# Patient Record
Sex: FEMALE | Race: WHITE | Employment: UNEMPLOYED | URBAN - METROPOLITAN AREA
[De-identification: names, ages, dates, MRNs, and addresses within clinical notes are randomized per-mention and may not be internally consistent; named-entity substitution may affect disease eponyms.]

---

## 2019-08-01 ENCOUNTER — HOSPITAL ENCOUNTER (OUTPATIENT)
Dept: GENERAL RADIOLOGY | Age: 59
Discharge: HOME | End: 2019-08-03
Payer: COMMERCIAL

## 2019-08-01 DIAGNOSIS — Z12.31 VISIT FOR SCREENING MAMMOGRAM: ICD-10-CM

## 2019-08-01 PROCEDURE — 77063 BREAST TOMOSYNTHESIS BI: CPT

## 2020-08-04 ENCOUNTER — HOSPITAL ENCOUNTER (OUTPATIENT)
Dept: GENERAL RADIOLOGY | Age: 60
Discharge: HOME | End: 2020-08-06
Payer: COMMERCIAL

## 2020-08-04 PROCEDURE — 77067 SCR MAMMO BI INCL CAD: CPT

## 2021-09-22 ENCOUNTER — HOSPITAL ENCOUNTER (OUTPATIENT)
Dept: GENERAL RADIOLOGY | Age: 61
Discharge: HOME | End: 2021-09-24
Payer: COMMERCIAL

## 2021-09-22 DIAGNOSIS — Z12.31 VISIT FOR SCREENING MAMMOGRAM: ICD-10-CM

## 2021-09-22 PROCEDURE — 77063 BREAST TOMOSYNTHESIS BI: CPT

## 2021-10-04 DIAGNOSIS — M79.671 RIGHT FOOT PAIN: ICD-10-CM

## 2021-10-04 DIAGNOSIS — M79.672 LEFT FOOT PAIN: Primary | ICD-10-CM

## 2021-10-05 ENCOUNTER — OFFICE VISIT (OUTPATIENT)
Dept: PODIATRY | Age: 61
End: 2021-10-05
Payer: COMMERCIAL

## 2021-10-05 VITALS — HEIGHT: 60 IN | BODY MASS INDEX: 29.45 KG/M2 | WEIGHT: 150 LBS

## 2021-10-05 DIAGNOSIS — R26.2 DIFFICULTY WALKING: ICD-10-CM

## 2021-10-05 DIAGNOSIS — M77.41 METATARSALGIA OF BOTH FEET: Primary | ICD-10-CM

## 2021-10-05 DIAGNOSIS — M77.51 TENDINITIS OF RIGHT FOOT: ICD-10-CM

## 2021-10-05 DIAGNOSIS — R60.0 LOCALIZED EDEMA: ICD-10-CM

## 2021-10-05 DIAGNOSIS — M77.42 METATARSALGIA OF BOTH FEET: Primary | ICD-10-CM

## 2021-10-05 DIAGNOSIS — M79.671 PAIN OF RIGHT FOOT: ICD-10-CM

## 2021-10-05 PROCEDURE — 29580 STRAPPING UNNA BOOT: CPT | Performed by: PODIATRIST

## 2021-10-05 PROCEDURE — 99203 OFFICE O/P NEW LOW 30 MIN: CPT | Performed by: PODIATRIST

## 2021-10-05 RX ORDER — EPINEPHRINE 0.15 MG/.3ML
INJECTION INTRAMUSCULAR
COMMUNITY
Start: 2021-09-07

## 2021-10-05 RX ORDER — EPINEPHRINE 0.3 MG/.3ML
INJECTION SUBCUTANEOUS
COMMUNITY
Start: 2021-09-08

## 2021-10-05 RX ORDER — LEVOTHYROXINE SODIUM 75 MCG
TABLET ORAL
COMMUNITY
Start: 2021-07-20

## 2021-10-05 NOTE — PROGRESS NOTES
10/5/21     Lauren Bowman    : 1960 Sex: female   Age: 59 y.o. Patient was referred by: None  Patient's PCP/Provider is:  Sohan Zepeda MD    Subjective:    Patient is seen today for evaluation regarding right foot pain and swelling to the midfoot and lateral hindfoot region. Chief Complaint   Patient presents with    Foot Pain     bilateral foot        HPI: Patient stated the issues have been going on for several months now and periodically are more symptomatic right lower extremity versus the left. Patient denies any recent injuries or changes in activities. Patient has tried OTC treatments without improvement noted. No other additional abnormalities noted. ROS:  Const: Positives and pertinent negatives as per HPI. Musculo: Denies symptoms other than stated above. Neuro: Denies symptoms other than stated above. Skin: Denies symptoms other than stated above. Current Medications:    Current Outpatient Medications:     vitamin D (CHOLECALCIFEROL) 28728 UNIT CAPS, cholecalciferol (vitamin D3) 1,250 mcg (50,000 unit) capsule  TAKE 1 CAPSULE BY MOUTH EVERY OTHER WEEK, Disp: , Rfl:     EPINEPHrine (EPIPEN JR) 0.15 MG/0.3ML SOAJ, , Disp: , Rfl:     EPINEPHrine (EPIPEN) 0.3 MG/0.3ML SOAJ injection, USE AS NEEDED AFTER BEE STING, Disp: , Rfl:     SYNTHROID 75 MCG tablet, , Disp: , Rfl:     Allergies: Allergies   Allergen Reactions    Beeswax Hives, Itching and Swelling       Vitals:    10/05/21 0929   Weight: 150 lb (68 kg)   Height: 5' (1.524 m)        No past medical history on file. No family history on file. No past surgical history on file. Social History     Tobacco Use    Smoking status: Former Smoker    Smokeless tobacco: Never Used   Substance Use Topics    Alcohol use: Not on file    Drug use: Not on file           Diagnostic studies:    No results found. Procedures: An unna boot  compressive wrap was applied to the right lower extremity.  It's purpose is to decrease  the amount of edema present, and to allow proper healing of the soft tissues. The patient was instructed to keep the unna boot clean, dry and intact until the next follow up visit. The patient was instructed to look for signs of redness, irritation, blistering and pain. If these or any other symptoms were to develop, they were advised to contact the office immediately for reevaluation. Exam:  VASCULAR: Pedal pulses palpable bilateral foot. Capillary refill time less than 5 seconds digits 1 through 5 bilateral foot. NEUROLOGICAL: Epicritic sensations intact and symmetrical  DERMATOLOGICAL: Mild edematous issues noted without ecchymosis overlying the right lateral midfoot and hindfoot regions. MUSCULOSKELETAL: Tenderness noted to palpation along the lateral peroneal tendon region right lower extremity. Tenderness also noted into the plantar ball the foot regions bilaterally. Mild antalgic gait noted upon evaluation. Plan Per Assessment  Joanne Benavidez was seen today for foot pain. Diagnoses and all orders for this visit:    Metatarsalgia of both feet    Tendinitis of right foot    Localized edema    Pain of right foot    Difficulty walking        1. New patient evaluation and management  2. We did review x-ray studies with patient in detail today bilateral foot. No acute osseous abnormalities noted. 3. We did recommend compression dressing to the symptomatic right lower extremity which was applied as described above. 4. We did discuss additional treatment options with patient in detail today. Patient was advised continued use of her good supportive shoe gear in the interim. Patient was advised on purchasing OTC insoles as instructed. 5. Patient will be followed up at a later date for continued evaluation and management.       Seen By:    Jose Alcocer DPM    Electronically signed by Jose Alcocer DPM on 10/5/2021 at 10:23 AM      This note was created using voice recognition software. The note was reviewed however may contain grammatical errors.

## 2021-10-05 NOTE — PROGRESS NOTES
Patient is in today for evaluation of both feet. Patient says her right foot will get swollen and hard to walk up and down the stairs. She will also get a sharp pain the side of her foot. Patient says she gets a snapping sound in her left foot.  pcp is Sydney Thompson MD  Last ov 6/16/21

## 2021-10-19 ENCOUNTER — OFFICE VISIT (OUTPATIENT)
Dept: PODIATRY | Age: 61
End: 2021-10-19
Payer: COMMERCIAL

## 2021-10-19 VITALS — HEIGHT: 60 IN | BODY MASS INDEX: 29.06 KG/M2 | WEIGHT: 148 LBS

## 2021-10-19 DIAGNOSIS — M77.42 METATARSALGIA OF BOTH FEET: Primary | ICD-10-CM

## 2021-10-19 DIAGNOSIS — M77.51 TENDINITIS OF RIGHT FOOT: ICD-10-CM

## 2021-10-19 DIAGNOSIS — M77.41 METATARSALGIA OF BOTH FEET: Primary | ICD-10-CM

## 2021-10-19 PROBLEM — M79.671 PAIN OF RIGHT FOOT: Status: RESOLVED | Noted: 2021-10-05 | Resolved: 2021-10-19

## 2021-10-19 PROBLEM — R60.0 LOCALIZED EDEMA: Status: RESOLVED | Noted: 2021-10-05 | Resolved: 2021-10-19

## 2021-10-19 PROCEDURE — 99213 OFFICE O/P EST LOW 20 MIN: CPT | Performed by: PODIATRIST

## 2021-10-19 NOTE — PROGRESS NOTES
Patient is in today for 2 week right foot pain. Patient says the unna wrap did help as the pain has decreased.  It does come and go on the top. pcp is Terence Saldana MD  Last ov 6/16/21

## 2021-10-19 NOTE — PROGRESS NOTES
10/19/21     Crispin Patino    : 1960   Sex: female    Age: 59 y.o. Patient's PCP/Provider is:  Jenifer Washington MD    Subjective:  Patient is seen today for follow-up regarding continued care issues to both lower extremities. Overall the patient is doing well with the current OTC insoles recommended. She is having minimal discomfort with her everyday activities. No other additional issues noted. Chief Complaint   Patient presents with    Foot Pain     right foot        ROS:  Const: Positives and pertinent negatives as per HPI. Musculo: Denies symptoms other than stated above. Neuro: Denies symptoms other than stated above. Skin: Denies symptoms other than stated above. Current Medications:    Current Outpatient Medications:     vitamin D (CHOLECALCIFEROL) 21746 UNIT CAPS, cholecalciferol (vitamin D3) 1,250 mcg (50,000 unit) capsule  TAKE 1 CAPSULE BY MOUTH EVERY OTHER WEEK, Disp: , Rfl:     EPINEPHrine (EPIPEN JR) 0.15 MG/0.3ML SOAJ, , Disp: , Rfl:     EPINEPHrine (EPIPEN) 0.3 MG/0.3ML SOAJ injection, USE AS NEEDED AFTER BEE STING, Disp: , Rfl:     SYNTHROID 75 MCG tablet, , Disp: , Rfl:     Allergies: Allergies   Allergen Reactions    Beeswax Hives, Itching and Swelling       Vitals:    10/19/21 0845   Weight: 148 lb (67.1 kg)   Height: 5' (1.524 m)       Exam:  NVS unchanged. No edema or ecchymosis noted plantar forefoot regions bilaterally. Increased ROM noted bilateral ankle/foot. Improved gait noted upon evaluation. Diagnostic Studies:     XR FOOT LEFT (MIN 3 VIEWS)    Result Date: 10/5/2021  EXAMINATION: THREE XRAY VIEWS OF THE LEFT FOOT; THREE XRAY VIEWS OF THE RIGHT FOOT 10/5/2021 9:21 am COMPARISON: None HISTORY: ORDERING SYSTEM PROVIDED HISTORY: Left foot pain TECHNOLOGIST PROVIDED HISTORY: Standing unless patient unable to stand; ORDERING SYSTEM PROVIDED HISTORY: Right foot pain FINDINGS: RIGHT FOOT:  Changes of bunionectomy with no evident complication.   Diffuse bony demineralization. No acute nor healing fracture. No periosteal thickening suggestive a stress fracture. Joints maintain anatomic alignment. Calcaneal enthesophyte at the origin of the plantar aponeurosis. Minimal arthropathic changes of the 1st metatarsophalangeal joint. No significant ankle joint effusion. No obvious acute soft tissue abnormality. LEFT FOOT:  Diffuse bony demineralization. No acute nor healing fracture. No periosteal thickening suggestive of a stress fracture. Joints maintain anatomic alignment. Calcaneal enthesophyte at the origin of the plantar aponeurosis. No significant arthropathic changes nor ankle joint effusion. No obvious acute soft tissue abnormality. 1. No acute findings in the feet. 2. Uncomplicated right bunionectomy. 3. 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 4. Bony demineralization. XR FOOT RIGHT (MIN 3 VIEWS)    Result Date: 10/5/2021  EXAMINATION: THREE XRAY VIEWS OF THE LEFT FOOT; THREE XRAY VIEWS OF THE RIGHT FOOT 10/5/2021 9:21 am COMPARISON: None HISTORY: ORDERING SYSTEM PROVIDED HISTORY: Left foot pain TECHNOLOGIST PROVIDED HISTORY: Standing unless patient unable to stand; ORDERING SYSTEM PROVIDED HISTORY: Right foot pain FINDINGS: RIGHT FOOT:  Changes of bunionectomy with no evident complication. Diffuse bony demineralization. No acute nor healing fracture. No periosteal thickening suggestive a stress fracture. Joints maintain anatomic alignment. Calcaneal enthesophyte at the origin of the plantar aponeurosis. Minimal arthropathic changes of the 1st metatarsophalangeal joint. No significant ankle joint effusion. No obvious acute soft tissue abnormality. LEFT FOOT:  Diffuse bony demineralization. No acute nor healing fracture. No periosteal thickening suggestive of a stress fracture. Joints maintain anatomic alignment. Calcaneal enthesophyte at the origin of the plantar aponeurosis.   No significant arthropathic changes nor ankle joint effusion. No obvious acute soft tissue abnormality. 1. No acute findings in the feet. 2. Uncomplicated right bunionectomy. 3. 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 4. Bony demineralization. Procedures:    None    Plan Per Assessment  Cory Arana was seen today for foot pain. Diagnoses and all orders for this visit:    Metatarsalgia of both feet    Tendinitis of right foot      1. Evaluation and management  2. Discussed continued use of her OTC insoles with activities. 3. Patient was advised of continued stretching exercises before and after activities. 4. Patient will be followed up if any additional Podiatric issues arise. Seen By:    Mary Sam DPM    Electronically signed by Mary Sam DPM on 10/19/2021 at 12:38 PM    This note was created using voice recognition software. The note was reviewed however may contain grammatical errors.

## 2022-10-20 ENCOUNTER — HOSPITAL ENCOUNTER (OUTPATIENT)
Dept: MAMMOGRAPHY | Age: 62
Discharge: HOME | End: 2022-10-22
Payer: COMMERCIAL

## 2022-10-20 VITALS — WEIGHT: 144 LBS | HEIGHT: 60 IN | BODY MASS INDEX: 28.27 KG/M2

## 2022-10-20 DIAGNOSIS — Z12.31 VISIT FOR SCREENING MAMMOGRAM: ICD-10-CM

## 2022-10-20 PROCEDURE — 77063 BREAST TOMOSYNTHESIS BI: CPT

## 2023-11-16 ENCOUNTER — HOSPITAL ENCOUNTER (OUTPATIENT)
Dept: MAMMOGRAPHY | Age: 63
Discharge: HOME | End: 2023-11-18
Payer: MEDICARE

## 2023-11-16 VITALS — WEIGHT: 150 LBS | BODY MASS INDEX: 29.45 KG/M2 | HEIGHT: 60 IN

## 2023-11-16 DIAGNOSIS — Z12.31 VISIT FOR SCREENING MAMMOGRAM: ICD-10-CM

## 2023-11-16 PROCEDURE — 77063 BREAST TOMOSYNTHESIS BI: CPT

## 2025-04-09 ENCOUNTER — HOSPITAL ENCOUNTER (OUTPATIENT)
Dept: GENERAL RADIOLOGY | Age: 65
Discharge: HOME | End: 2025-04-11
Payer: MEDICARE

## 2025-04-09 VITALS — HEIGHT: 60 IN | BODY MASS INDEX: 27.88 KG/M2 | WEIGHT: 142 LBS

## 2025-04-09 DIAGNOSIS — Z12.31 ENCOUNTER FOR SCREENING MAMMOGRAM FOR MALIGNANT NEOPLASM OF BREAST: ICD-10-CM

## 2025-04-09 PROCEDURE — 77063 BREAST TOMOSYNTHESIS BI: CPT
